# Patient Record
Sex: FEMALE | URBAN - METROPOLITAN AREA
[De-identification: names, ages, dates, MRNs, and addresses within clinical notes are randomized per-mention and may not be internally consistent; named-entity substitution may affect disease eponyms.]

---

## 2018-11-28 ENCOUNTER — APPOINTMENT (OUTPATIENT)
Age: 73
Setting detail: DERMATOLOGY
End: 2018-11-30

## 2018-11-28 DIAGNOSIS — L24 IRRITANT CONTACT DERMATITIS: ICD-10-CM

## 2018-11-28 PROBLEM — L24.9 IRRITANT CONTACT DERMATITIS, UNSPECIFIED CAUSE: Status: ACTIVE | Noted: 2018-11-28

## 2018-11-28 PROCEDURE — 99202 OFFICE O/P NEW SF 15 MIN: CPT

## 2018-11-28 PROCEDURE — OTHER PRESCRIPTION MEDICATION MANAGEMENT: OTHER

## 2018-11-28 PROCEDURE — OTHER PRESCRIPTION: OTHER

## 2018-11-28 PROCEDURE — OTHER COUNSELING: OTHER

## 2018-11-28 PROCEDURE — OTHER PHOTO-DOCUMENTATION: OTHER

## 2018-11-28 RX ORDER — DESOXIMETASONE 0.5 MG/G
OINTMENT TOPICAL
Qty: 1 | Refills: 0 | Status: ERX

## 2018-11-28 RX ORDER — PIMECROLIMUS 10 MG/G
CREAM TOPICAL
Qty: 1 | Refills: 1 | Status: ERX | COMMUNITY
Start: 2018-11-28

## 2018-11-28 RX ORDER — CEPHALEXIN 500 MG/1
CAPSULE ORAL
Qty: 60 | Refills: 0 | Status: ERX

## 2018-11-28 ASSESSMENT — LOCATION ZONE DERM: LOCATION ZONE: LIP

## 2018-11-28 ASSESSMENT — LOCATION DETAILED DESCRIPTION DERM
LOCATION DETAILED: RIGHT SUPERIOR VERMILION LIP
LOCATION DETAILED: PHILTRUM
LOCATION DETAILED: LEFT INFERIOR VERMILION LIP
LOCATION DETAILED: LEFT LOWER CUTANEOUS LIP

## 2018-11-28 ASSESSMENT — LOCATION SIMPLE DESCRIPTION DERM
LOCATION SIMPLE: LEFT LIP
LOCATION SIMPLE: RIGHT LIP
LOCATION SIMPLE: UPPER LIP

## 2018-11-28 NOTE — PROCEDURE: PRESCRIPTION MEDICATION MANAGEMENT
Otc Regimen: Vaseline/Aquaphor prior to eating, drinking, brushing teeth
Discontinue Regimen: Fragrance soaps, cosmetics, whitening toothpaste
Render In Strict Bullet Format?: No
Samples Given: Cetaphil wash, Aquaphor
Plan: no signs of yeast in lips angles, no swelling of throat, lips, tongues. pt made aware if this start she should head directly to the ER
Initiate Treatment: Keflex 500mg BID x1 month, pt was originally given rx by PCP but wasn't sure how long it was written for. \\nTopicort 0.05% ointment apply BID 5 days, then once daily for 5 days and stop.\\nElidel 1% cream-use after stopping topical steroids, cautioned about insurance coverage
Detail Level: Zone

## 2018-12-15 ENCOUNTER — APPOINTMENT (OUTPATIENT)
Age: 73
Setting detail: DERMATOLOGY
End: 2018-12-19

## 2018-12-15 DIAGNOSIS — L24 IRRITANT CONTACT DERMATITIS: ICD-10-CM

## 2018-12-15 PROBLEM — L30.9 DERMATITIS, UNSPECIFIED: Status: ACTIVE | Noted: 2018-12-15

## 2018-12-15 PROCEDURE — OTHER PRESCRIPTION: OTHER

## 2018-12-15 PROCEDURE — OTHER PRESCRIPTION MEDICATION MANAGEMENT: OTHER

## 2018-12-15 PROCEDURE — OTHER ORDER TESTS: OTHER

## 2018-12-15 PROCEDURE — OTHER PHOTO-DOCUMENTATION: OTHER

## 2018-12-15 PROCEDURE — 99213 OFFICE O/P EST LOW 20 MIN: CPT

## 2018-12-15 PROCEDURE — OTHER COUNSELING: OTHER

## 2018-12-15 PROCEDURE — OTHER ADDITIONAL NOTES: OTHER

## 2018-12-15 RX ORDER — KETOCONAZOLE 20 MG/G
CREAM TOPICAL
Qty: 1 | Refills: 0 | Status: ERX | COMMUNITY
Start: 2018-12-15

## 2018-12-15 RX ORDER — DOXYCYCLINE HYCLATE 100 MG/1
CAPSULE, GELATIN COATED ORAL
Qty: 60 | Refills: 0 | Status: ERX | COMMUNITY
Start: 2018-12-15

## 2018-12-15 ASSESSMENT — LOCATION SIMPLE DESCRIPTION DERM
LOCATION SIMPLE: UPPER LIP
LOCATION SIMPLE: LEFT LIP
LOCATION SIMPLE: RIGHT INFERIOR LIP
LOCATION SIMPLE: LEFT SUPERIOR LIP
LOCATION SIMPLE: RIGHT LIP

## 2018-12-15 ASSESSMENT — LOCATION DETAILED DESCRIPTION DERM
LOCATION DETAILED: LEFT INFERIOR VERMILION LIP
LOCATION DETAILED: RIGHT SUPERIOR VERMILION LIP
LOCATION DETAILED: LEFT LOWER CUTANEOUS LIP
LOCATION DETAILED: PHILTRUM
LOCATION DETAILED: RIGHT INFERIOR VERMILION LIP
LOCATION DETAILED: LEFT SUPERIOR VERMILION LIP

## 2018-12-15 ASSESSMENT — LOCATION ZONE DERM: LOCATION ZONE: LIP

## 2018-12-15 NOTE — PROCEDURE: ADDITIONAL NOTES
Additional Notes: pt has been dealing with this for over 6 weeks. states it started on the lips and spread from there. originally saw urgent care, did two different trials of pred without relief. saw PCP a couple weeks ago, a day before our initial visit, was swabbed at that time and given keflex. at our visit pt didn't have culture report yet, but since we saw her pt was told it was \"strep B\" and \"gram negative bacilli\" that were isolated and she stopped keflex and was put on cipro for 10 days.  pt followed our instructions to use topicort ointment and elidel, just started elidel a day or so ago. rash has slightly improved but burning tingling sensation is still present. pt has allergist appt scheduled on jan 8
Detail Level: Simple

## 2018-12-15 NOTE — PROCEDURE: PRESCRIPTION MEDICATION MANAGEMENT
Otc Regimen: Vaseline/Aquaphor prior to eating, drinking, brushing teeth
Plan: Culture from PCP showed strep b and gram negative bacilli isolated; she was advised to stop Keflex (pt only used 5 days of Keflex) and given Cipro 500mg x10 days and was referred to Allergist-appt is January 8, 2019.
Render In Strict Bullet Format?: No
Detail Level: Zone
Discontinue Regimen: Fragrance soaps, cosmetics, whitening toothpaste
Initiate Treatment: Doxycycline 100mg BID for 1 month\\nKetoconazole 2% cream 1-2x daily
Continue Regimen: Elidel 1% cream 1-2x daily

## 2018-12-27 ENCOUNTER — RX ONLY (RX ONLY)
Age: 73
End: 2018-12-27

## 2018-12-27 RX ORDER — FLUCONAZOLE 100 MG/1
TABLET ORAL
Qty: 7 | Refills: 0 | Status: ERX | COMMUNITY
Start: 2018-12-27

## 2018-12-27 RX ORDER — CIPROFLOXACIN HYDROCHLORIDE 500 MG/1
TABLET, FILM COATED ORAL
Qty: 28 | Refills: 0 | Status: ERX | COMMUNITY
Start: 2018-12-27

## 2019-01-14 ENCOUNTER — APPOINTMENT (OUTPATIENT)
Age: 74
Setting detail: DERMATOLOGY
End: 2019-01-19

## 2019-01-14 DIAGNOSIS — L24 IRRITANT CONTACT DERMATITIS: ICD-10-CM

## 2019-01-14 PROBLEM — L30.9 DERMATITIS, UNSPECIFIED: Status: ACTIVE | Noted: 2019-01-14

## 2019-01-14 PROCEDURE — OTHER COUNSELING: OTHER

## 2019-01-14 PROCEDURE — OTHER PRESCRIPTION MEDICATION MANAGEMENT: OTHER

## 2019-01-14 PROCEDURE — 99213 OFFICE O/P EST LOW 20 MIN: CPT

## 2019-01-14 ASSESSMENT — LOCATION DETAILED DESCRIPTION DERM
LOCATION DETAILED: PHILTRUM
LOCATION DETAILED: RIGHT SUPERIOR VERMILION LIP
LOCATION DETAILED: LEFT LOWER CUTANEOUS LIP
LOCATION DETAILED: LEFT INFERIOR VERMILION LIP

## 2019-01-14 ASSESSMENT — LOCATION SIMPLE DESCRIPTION DERM
LOCATION SIMPLE: RIGHT LIP
LOCATION SIMPLE: LEFT LIP
LOCATION SIMPLE: UPPER LIP

## 2019-01-14 ASSESSMENT — LOCATION ZONE DERM: LOCATION ZONE: LIP

## 2020-01-22 NOTE — HPI: RASH
Medicare Wellness Visit  Plan for Preventive Care    A good way for you to stay healthy is to use preventive care. Medicare covers many services that can help you stay healthy. * The goal of these services is to find any health problems as quickly as possible. Finding problems early can help make them easier to treat. Your personal plan below lists the services you may need and when they are due. Health Maintenance Summary     DM/CKD Microalbumin (Yearly)  Ordered on 8/27/2019    Medicare Wellness 65+ (Yearly)  Due since 1/21/2020    DTaP/Tdap/Td Vaccine (1 - Tdap)  Postponed until 10/31/2026    Shingles Vaccine (2 of 2)  Next due on 1/25/2020    DM/CKD GFR (Yearly)  Ordered on 8/27/2019    Depression Screening (Yearly)  Next due on 1/22/2021    Pneumococcal Vaccine 65+   Completed    Influenza Vaccine   Completed    Meningococcal Vaccine   Aged Out           Preventive Care for Women and Men    Heart Screenings (Cardiovascular):  Â· Blood tests are used to check your cholesterol, lipid and triglyceride levels. High levels can increase your risk for heart disease and stroke. High levels can be treated with medications, diet and exercise. Lowering your levels can help keep your heart and blood vessels healthy. Your provider will order these tests if they are needed. Â· An ultrasound is done to see if you have an abdominal aortic aneurysm (AAA). This is an enlargement of one of the main blood vessels that delivers blood to the body. In the [facility], 9,000 deaths are caused by AAA. You may not even know you have this problem and as many as 1 in 3 people will have a serious problem if it is not treated. Early diagnosis allows for more effective treatment and cure. If you have a family history of AAA or are a male age 70-76 who has smoked, you are at higher risk of an AAA. Your provider can order this test, if needed.     Colorectal Screening:  Â· There are many tests that are used to check for cancer
What Type Of Note Output Would You Prefer (Optional)?: Standard Output
How Severe Is Your Rash?: mild
of your colon and rectum. You and your provider should discuss what test is best for you and when to have it done. Options include:  Â· Screening Colonoscopy: exam of the entire colon, seen through a flexible lighted tube. Â· Flexible Sigmoidoscopy: exam of the last third (sigmoid portion) of the colon and rectum, seen through a flexible lighted tube. Â· Cologuard DNA stool test: a sample of your stool is used to screen for cancer and unseen blood in your stool. Â· Fecal Occult Blood Test: a sample of your stool is studied to find any unseen blood    Flu Shot:  Â· An immunization that helps to prevent influenza (the flu). You should get this every year. The best time to get the shot is in the fall. Pneumococcal Shot:  â¢ Vaccines are available that can help prevent pneumococcal disease, which is any type of infection caused by Streptococcus pneumoniae bacteria. Their use can prevent some cases of pneumonia, meningitis, and sepsis. There are two types of pneumococcal vaccines:   o Conjugate vaccines (PCV-13 or Prevnar 13Â®) - helps protect against the 13 types of pneumococcal bacteria that are the most common causes of serious infections in children and adults. o Polysaccharide vaccine (PPSV23 or Ppuldccyx27Â®) - helps protect against 23 types of pneumococcal bacteria for patients who are recommended to get it. These vaccines should be given at least 12 months apart. A booster is usually not needed. Hepatitis B Shot:  Â· An immunization that helps to protect people from getting Hepatitis B. Hepatitis B is a virus that spreads through contact with infected blood or body fluids. Many people with the virus do not have symptoms. The virus can lead to serious problems, such as liver disease. Some people are at higher risk than others. Your doctor will tell you if you need this shot. Diabetes Screening:  Â· A test to measure sugar (glucose) in your blood is called a fasting blood sugar.  Fasting means you
Is This A New Presentation, Or A Follow-Up?: Rash
cannot have food or drink for at least 8 hours before the test. This test can detect diabetes long before you may notice symptoms. Glaucoma Screening:  Â· Glaucoma screening is performed by your eye doctor. The test measures the fluid pressure inside your eyes to determine if you have glaucoma. Hepatitis C Screening:  Â· A blood test to see if you have the hepatitis C virus. Hepatitis C attacks the liver and is a major cause of chronic liver disease. Medicare will cover a single screening for all adults born between [facility], or high risk patients (people who have injected illegal drugs or people who have had blood transfusions). High risk patients who continue to inject illegal drugs can be screened for Hepatitis C every year. Smoking and Tobacco-Use Cessation Counseling:  Â· Tobacco is the single greatest cause of disease and early death in our country today. Medication and counseling together can increase a personâs chance of quitting for good. Â· Medicare covers two quitting attempts per year, with four counseling sessions per attempt (eight sessions in a 12 month period)    Preventive Screening tests for Women    Screening Mammograms and Breast Exams:  Â· An x-ray of your breasts to check for breast cancer before you or your doctor may be able to feel it. If breast cancer is found early it can usually be treated with success. Pelvic Exams and Pap Tests:  Â· An exam to check for cervical and vaginal cancer. A Pap test is a lab test in which cells are taken from your cervix and sent to the lab to look for signs of cervical cancer. If cancer of the cervix is found early, chances for a cure are good. Testing can generally end at age 72, or if a woman has a hysterectomy for a benign condition. Your provider may recommend more frequent testing if certain abnormal results are found. Bone Mass Measurements:  Â· A painless x-ray of your bone density to see if you are at risk for a broken bone.  Bone
Additional History: Pt states rash around mouth and lips started about 5 weeks ago, she’s been applying vaseline only. She did see pcp yesterday for referral to come here and was given Keflex 500mg and Hydrocortisone 1% cream, but didn’t fill prescriptions. Pt states she first noticed lips tingling and stinging. She states about 10 years ago she ate a teto and lips swelled up. Pt states when she was at urgent care they gave her a tapered dose for prednisone, which she used for 1 week.
density refers to the thickness of bones or how tightly the bone tissue is packed. Preventive Screening tests for Men    Prostate Screening:  Â· Should you have a prostate cancer test (PSA)? It is up to you to decide if you want a prostate cancer test. Talk to your clinician to find out if the test is right for you. Things for you to consider and talk about should include:  Â· Benefits and harms of the test  Â· Your family history  Â· How your race/ethnicity may influence the test  Â· If the test may impact other medical conditions you have  Â· Your values on screenings and treatments    *Medicare pays for many preventive services to keep you healthy. For some of these services, you might have to pay a deductible, coinsurance, and / or copayment. The amounts vary depending on the type of services you need and the kind of Medicare health plan you have.

## 2021-11-05 NOTE — PROCEDURE: PRESCRIPTION MEDICATION MANAGEMENT
Plan: Patient saw allergist who stated pt possibly could have an underlying contact dermatitis. Allergist did scratch test and found nothing else. Patient has upcoming patch test with allergist. \\nPatient completed oral antibiotic, significantly improved,slightly pink.
Detail Level: Zone
Continue Regimen: Elidel 1% cream, d/c over the next month
Otc Regimen: Vaseline/Aquaphor prior to eating, drinking, brushing teeth
Discontinue Regimen: keto cream
Render In Strict Bullet Format?: No
This document is complete and the patient is ready for discharge.